# Patient Record
Sex: FEMALE | Race: WHITE | URBAN - METROPOLITAN AREA
[De-identification: names, ages, dates, MRNs, and addresses within clinical notes are randomized per-mention and may not be internally consistent; named-entity substitution may affect disease eponyms.]

---

## 2021-04-10 ENCOUNTER — IMMUNIZATION (OUTPATIENT)
Dept: FAMILY MEDICINE CLINIC | Age: 21
End: 2021-04-10
Payer: COMMERCIAL

## 2021-04-10 PROCEDURE — 91300 COVID-19, PFIZER VACCINE 30MCG/0.3ML DOSE: CPT | Performed by: FAMILY MEDICINE

## 2021-04-10 PROCEDURE — 0001A COVID-19, PFIZER VACCINE 30MCG/0.3ML DOSE: CPT | Performed by: FAMILY MEDICINE

## 2021-05-01 ENCOUNTER — IMMUNIZATION (OUTPATIENT)
Dept: FAMILY MEDICINE CLINIC | Age: 21
End: 2021-05-01
Payer: COMMERCIAL

## 2021-05-01 PROCEDURE — 0002A COVID-19, PFIZER VACCINE 30MCG/0.3ML DOSE: CPT | Performed by: FAMILY MEDICINE

## 2021-05-01 PROCEDURE — 91300 COVID-19, PFIZER VACCINE 30MCG/0.3ML DOSE: CPT | Performed by: FAMILY MEDICINE

## 2024-01-09 ENCOUNTER — OFFICE VISIT (OUTPATIENT)
Age: 24
End: 2024-01-09

## 2024-01-09 VITALS
SYSTOLIC BLOOD PRESSURE: 119 MMHG | DIASTOLIC BLOOD PRESSURE: 80 MMHG | WEIGHT: 201 LBS | HEIGHT: 65 IN | BODY MASS INDEX: 33.49 KG/M2 | TEMPERATURE: 98.7 F | HEART RATE: 116 BPM | OXYGEN SATURATION: 98 %

## 2024-01-09 DIAGNOSIS — R05.9 COUGH, UNSPECIFIED TYPE: Primary | ICD-10-CM

## 2024-01-09 DIAGNOSIS — J40 BRONCHITIS: ICD-10-CM

## 2024-01-09 LAB
INFLUENZA VIRUS A RNA: NEGATIVE
INFLUENZA VIRUS B RNA: NEGATIVE
Lab: NORMAL
QC PASS/FAIL: NORMAL
SARS-COV-2 RDRP RESP QL NAA+PROBE: NEGATIVE

## 2024-01-09 RX ORDER — AZITHROMYCIN 250 MG/1
250 TABLET, FILM COATED ORAL SEE ADMIN INSTRUCTIONS
Qty: 6 TABLET | Refills: 0 | Status: SHIPPED | OUTPATIENT
Start: 2024-01-09 | End: 2024-01-14

## 2024-01-09 RX ORDER — BENZONATATE 100 MG/1
100 CAPSULE ORAL 3 TIMES DAILY PRN
Qty: 30 CAPSULE | Refills: 0 | Status: SHIPPED | OUTPATIENT
Start: 2024-01-09 | End: 2024-01-19

## 2024-01-09 ASSESSMENT — ENCOUNTER SYMPTOMS: COUGH: 1

## 2024-01-09 NOTE — PROGRESS NOTES
Adriane Payne (:  2000) is a 23 y.o. female,New patient, here for evaluation of the following chief complaint(s):  Cough (Wet cough, chest pain, fever, shortness of breath, symptoms x 3 days. )      ASSESSMENT/PLAN:  1. Cough, unspecified type  - POCT COVID-19 Rapid, NAAT NEGATIVE  - POCT Influenza A/B DNA NEGATIVE    2. Bronchitis  - azithromycin (ZITHROMAX) 250 MG tablet; Take 1 tablet by mouth See Admin Instructions for 5 days 500mg on day 1 followed by 250mg on days 2 - 5  Dispense: 6 tablet; Refill: 0  - benzonatate (TESSALON) 100 MG capsule; Take 1 capsule by mouth 3 times daily as needed for Cough  Dispense: 30 capsule; Refill: 0   - cough, yellow phlegm for 3 days, negative test result for covid and flu. No sob or fever, no chest pain    Return if symptoms worsen or fail to improve.    SUBJECTIVE/OBJECTIVE:  PRESENT TO CLINIC WITH COUGH AND YELLOW PHLEGM FOR 2 DAYS.NO FEVER      History provided by:  Patient  Cough        Vitals:    24 1134   BP: 119/80   Pulse: (!) 116   Temp: 98.7 °F (37.1 °C)   TempSrc: Oral   SpO2: 98%   Weight: 91.2 kg (201 lb)   Height: 1.651 m (5' 5\")       Review of Systems   Respiratory:  Positive for cough.        Physical Exam  Constitutional:       Appearance: Normal appearance.   HENT:      Head: Normocephalic and atraumatic.      Nose: Nose normal.      Mouth/Throat:      Pharynx: Oropharynx is clear.   Eyes:      Pupils: Pupils are equal, round, and reactive to light.   Pulmonary:      Effort: Pulmonary effort is normal.      Breath sounds: Normal breath sounds.   Musculoskeletal:         General: Normal range of motion.      Cervical back: Normal range of motion and neck supple.   Neurological:      Mental Status: She is alert and oriented to person, place, and time.   Psychiatric:         Mood and Affect: Mood normal.           An electronic signature was used to authenticate this note.    --Robyn Roca DO

## 2024-03-13 SDOH — HEALTH STABILITY: PHYSICAL HEALTH: ON AVERAGE, HOW MANY MINUTES DO YOU ENGAGE IN EXERCISE AT THIS LEVEL?: 20 MIN

## 2024-03-13 SDOH — HEALTH STABILITY: PHYSICAL HEALTH: ON AVERAGE, HOW MANY DAYS PER WEEK DO YOU ENGAGE IN MODERATE TO STRENUOUS EXERCISE (LIKE A BRISK WALK)?: 2 DAYS

## 2024-03-13 ASSESSMENT — ENCOUNTER SYMPTOMS
NAUSEA: 0
WHEEZING: 0
CHEST TIGHTNESS: 0
VOMITING: 0
COUGH: 0
SHORTNESS OF BREATH: 0
CONSTIPATION: 0
ABDOMINAL PAIN: 0
DIARRHEA: 0

## 2024-03-14 ENCOUNTER — OFFICE VISIT (OUTPATIENT)
Dept: FAMILY MEDICINE CLINIC | Age: 24
End: 2024-03-14
Payer: COMMERCIAL

## 2024-03-14 VITALS
SYSTOLIC BLOOD PRESSURE: 112 MMHG | BODY MASS INDEX: 33.45 KG/M2 | HEIGHT: 65 IN | DIASTOLIC BLOOD PRESSURE: 68 MMHG | OXYGEN SATURATION: 99 % | HEART RATE: 109 BPM

## 2024-03-14 DIAGNOSIS — F41.9 ANXIETY: ICD-10-CM

## 2024-03-14 DIAGNOSIS — J30.89 ENVIRONMENTAL AND SEASONAL ALLERGIES: Primary | ICD-10-CM

## 2024-03-14 DIAGNOSIS — Z76.89 ENCOUNTER TO ESTABLISH CARE: ICD-10-CM

## 2024-03-14 DIAGNOSIS — F41.0 PANIC ATTACKS: ICD-10-CM

## 2024-03-14 PROCEDURE — 99204 OFFICE O/P NEW MOD 45 MIN: CPT | Performed by: CLINICAL NURSE SPECIALIST

## 2024-03-14 RX ORDER — FLUTICASONE PROPIONATE 50 MCG
SPRAY, SUSPENSION (ML) NASAL
COMMUNITY
Start: 2019-01-01

## 2024-03-14 RX ORDER — ESCITALOPRAM OXALATE 10 MG/1
10 TABLET ORAL DAILY
Qty: 30 TABLET | Refills: 0 | Status: SHIPPED | OUTPATIENT
Start: 2024-03-14

## 2024-03-14 SDOH — ECONOMIC STABILITY: HOUSING INSECURITY
IN THE LAST 12 MONTHS, WAS THERE A TIME WHEN YOU DID NOT HAVE A STEADY PLACE TO SLEEP OR SLEPT IN A SHELTER (INCLUDING NOW)?: NO

## 2024-03-14 SDOH — ECONOMIC STABILITY: FOOD INSECURITY: WITHIN THE PAST 12 MONTHS, YOU WORRIED THAT YOUR FOOD WOULD RUN OUT BEFORE YOU GOT MONEY TO BUY MORE.: NEVER TRUE

## 2024-03-14 SDOH — ECONOMIC STABILITY: INCOME INSECURITY: HOW HARD IS IT FOR YOU TO PAY FOR THE VERY BASICS LIKE FOOD, HOUSING, MEDICAL CARE, AND HEATING?: NOT HARD AT ALL

## 2024-03-14 SDOH — ECONOMIC STABILITY: FOOD INSECURITY: WITHIN THE PAST 12 MONTHS, THE FOOD YOU BOUGHT JUST DIDN'T LAST AND YOU DIDN'T HAVE MONEY TO GET MORE.: NEVER TRUE

## 2024-03-14 ASSESSMENT — PATIENT HEALTH QUESTIONNAIRE - PHQ9
SUM OF ALL RESPONSES TO PHQ QUESTIONS 1-9: 0
2. FEELING DOWN, DEPRESSED OR HOPELESS: 0
SUM OF ALL RESPONSES TO PHQ9 QUESTIONS 1 & 2: 0
1. LITTLE INTEREST OR PLEASURE IN DOING THINGS: 0
SUM OF ALL RESPONSES TO PHQ QUESTIONS 1-9: 0

## 2024-03-14 ASSESSMENT — ENCOUNTER SYMPTOMS
EYE ITCHING: 1
RHINORRHEA: 1
BACK PAIN: 0
COLOR CHANGE: 0
SINUS PRESSURE: 1

## 2024-03-14 NOTE — PROGRESS NOTES
0    3. Panic attacks  - Trial of Lexapro 10 mg once daily  - Discussed panic attacks, information given  - escitalopram (LEXAPRO) 10 MG tablet; Take 1 tablet by mouth daily  Dispense: 30 tablet; Refill: 0    4. Encounter to establish care  - Sign appropriate paperwork to have records sent to the office  - Encouraged to schedule complete physical      Continue current treatment plan.    Current Outpatient Medications   Medication Sig Dispense Refill    fluticasone (FLONASE) 50 MCG/ACT nasal spray       escitalopram (LEXAPRO) 10 MG tablet Take 1 tablet by mouth daily 30 tablet 0    CETIRIZINE HCL PO       LORATADINE PO        No current facility-administered medications for this visit.      Return in about 1 month (around 4/14/2024), or if symptoms worsen or fail to improve, for allergies, anxiety, panic attacks, eatablish care.    Adriane received counseling on the following healthy behaviors: Needs do insurancenutrition, exercise, and medication adherence    Patient given educational materials on anxiety, panic attacks    Discussed use, benefit, and side effects of prescribed medications.  Barriers to medication compliance addressed.  All patient questions answered.  Pt voiced understanding.     Call office if experience side effects from medications.      Please note that some or all of this record was generated using voice recognition software. If there are any questions about the content of this document, please contact the author as some errors in transcription may have occurred.

## 2024-03-14 NOTE — PATIENT INSTRUCTIONS
Sign appropriate paperwork to have records sent to the office    Discussed allergies, information given    Continue Claritin 10 mg and Zyrtec 10 mg nightly for allergies    Continue Flonase 1 to 2 puffs to each nostril daily    Discussed anxiety, information given    Trial of Lexapro 5 mg (half tab) for a week then increase to full tab daily    Follow-up in 1 months, sooner if symptoms worsen or persist

## 2024-04-09 DIAGNOSIS — F41.9 ANXIETY: ICD-10-CM

## 2024-04-09 RX ORDER — ESCITALOPRAM OXALATE 10 MG/1
10 TABLET ORAL DAILY
Qty: 15 TABLET | Refills: 0 | Status: SHIPPED | OUTPATIENT
Start: 2024-04-09

## 2024-04-09 NOTE — TELEPHONE ENCOUNTER
----- Message from Adriane Payne sent at 4/8/2024 10:04 PM EDT -----  Regarding: Prescription   Contact: [phone]  Hello! I think the anxiety medicine is actually working! Yay! Question though, i won’t be able to have an appointment with you until after my prescription runs out. Is that going to be a problem? Should i be asking for a refill?

## 2024-04-24 ASSESSMENT — ENCOUNTER SYMPTOMS
WHEEZING: 0
VOMITING: 0
DIARRHEA: 0
CHEST TIGHTNESS: 0
COUGH: 0
CONSTIPATION: 0
NAUSEA: 0
SHORTNESS OF BREATH: 0
ABDOMINAL PAIN: 0

## 2024-04-25 ENCOUNTER — OFFICE VISIT (OUTPATIENT)
Dept: FAMILY MEDICINE CLINIC | Age: 24
End: 2024-04-25
Payer: COMMERCIAL

## 2024-04-25 VITALS
DIASTOLIC BLOOD PRESSURE: 62 MMHG | OXYGEN SATURATION: 99 % | HEIGHT: 65 IN | HEART RATE: 97 BPM | SYSTOLIC BLOOD PRESSURE: 110 MMHG | WEIGHT: 207 LBS | BODY MASS INDEX: 34.49 KG/M2

## 2024-04-25 DIAGNOSIS — F41.0 PANIC ATTACKS: ICD-10-CM

## 2024-04-25 DIAGNOSIS — J30.89 ENVIRONMENTAL AND SEASONAL ALLERGIES: Primary | ICD-10-CM

## 2024-04-25 DIAGNOSIS — F41.9 ANXIETY: ICD-10-CM

## 2024-04-25 PROCEDURE — 99213 OFFICE O/P EST LOW 20 MIN: CPT | Performed by: CLINICAL NURSE SPECIALIST

## 2024-04-25 RX ORDER — ESCITALOPRAM OXALATE 10 MG/1
10 TABLET ORAL DAILY
Qty: 30 TABLET | Refills: 2 | Status: SHIPPED | OUTPATIENT
Start: 2024-04-25

## 2024-04-25 ASSESSMENT — PATIENT HEALTH QUESTIONNAIRE - PHQ9
SUM OF ALL RESPONSES TO PHQ QUESTIONS 1-9: 0
2. FEELING DOWN, DEPRESSED OR HOPELESS: NOT AT ALL
1. LITTLE INTEREST OR PLEASURE IN DOING THINGS: NOT AT ALL
SUM OF ALL RESPONSES TO PHQ9 QUESTIONS 1 & 2: 0
SUM OF ALL RESPONSES TO PHQ QUESTIONS 1-9: 0

## 2024-04-25 NOTE — PATIENT INSTRUCTIONS
Continue Claritin 10 mg and Zyrtec 10 mg nightly for allergies     Continue Flonase 1 to 2 puffs to each nostril daily     Discussed anxiety, information given     Trial of Lexapro 5 mg (half tab) for a week then increase to full tab daily     Follow-up in 1 months, sooner if symptoms worsen or persist

## 2024-04-25 NOTE — PROGRESS NOTES
SUBJECTIVE:    Patient ID:  Adriane Payne is a 23 y.o. female      Patient is here to establish care, discuss allergies with possible referral to allergist and to discuss treatment for anxiety.       Patient's allergies, medication, medical, surgical, family and social history were reviewed and updated accordingly.  She is working on her Masters in behavorial analysis at .       States she has been taking Claritin in the morning and Zyrtec at night, with minimal relief.  She is requesting a referral to an allergist.      Anxiety: Patient complains of evaluation of anxiety disorder and panic attacks.  She has the following anxiety symptoms: difficulty concentrating, feelings of losing control, irritable, palpitations, psychomotor agitation, racing thoughts. Onset of symptoms was approximately several years ago, unchanged since that time. She denies current thoughts of self harm, suicidal and homicidal ideation. Family history significant for anxiety and depression.Possible organic causes contributing are: nicotine vape. Risk factors: positive family history in  parents and negative life event COVID pandemic.  Previous treatment includes none and none.  She complains of the following side effects from the treatment: none.    Visited grandmother in [place] recently.          Current Outpatient Medications on File Prior to Visit   Medication Sig Dispense Refill    CETIRIZINE HCL PO       LORATADINE PO       fluticasone (FLONASE) 50 MCG/ACT nasal spray        No current facility-administered medications on file prior to visit.      Past Medical History:   Diagnosis Date    Allergic rhinitis 2008    Anxiety 2019    Asthma 2022    last allergist said i have it, could actually not?     History reviewed. No pertinent surgical history.  Family History   Problem Relation Age of Onset    Hypertension Father     Asthma Father     High Blood Pressure Father     Breast Cancer Maternal Grandmother     Diabetes Maternal

## 2024-05-31 DIAGNOSIS — J40 BRONCHITIS: ICD-10-CM

## 2024-06-03 RX ORDER — BENZONATATE 100 MG/1
CAPSULE ORAL
Qty: 30 CAPSULE | Refills: 0 | OUTPATIENT
Start: 2024-06-03

## 2024-08-11 DIAGNOSIS — F41.9 ANXIETY: ICD-10-CM

## 2024-08-12 ENCOUNTER — PATIENT MESSAGE (OUTPATIENT)
Dept: FAMILY MEDICINE CLINIC | Age: 24
End: 2024-08-12

## 2024-08-12 DIAGNOSIS — F41.9 ANXIETY: ICD-10-CM

## 2024-08-12 RX ORDER — ESCITALOPRAM OXALATE 10 MG/1
10 TABLET ORAL DAILY
Qty: 15 TABLET | Refills: 0 | Status: SHIPPED | OUTPATIENT
Start: 2024-08-12

## 2024-08-12 RX ORDER — ESCITALOPRAM OXALATE 10 MG/1
10 TABLET ORAL DAILY
Qty: 30 TABLET | Refills: 2 | OUTPATIENT
Start: 2024-08-12

## 2024-08-12 NOTE — TELEPHONE ENCOUNTER
Please let patient know partial refill has been sent in to the pharmacy and have her schedule an appointment within the next week or two. Thanks

## 2024-08-25 NOTE — PROGRESS NOTES
SUBJECTIVE:    Patient ID:  Adriane Payne is a 24 y.o. female      Patient is here for a medication check for allergies, anxiety and panic attacks.  She is doing fair to well in current regimen.  States she does notice an increase of anxiety around her menstrual cycle.       States she has been taking Claritin in the morning and Zyrtec at night, with minimal relief.  She is requesting a referral to an allergist.      Anxiety: Patient complains of evaluation of anxiety disorder and panic attacks.  She has the following anxiety symptoms: difficulty concentrating, feelings of losing control, irritable, palpitations, psychomotor agitation, racing thoughts. Onset of symptoms was approximately several years ago, unchanged since that time. She denies current thoughts of self harm, suicidal and homicidal ideation. Family history significant for anxiety and depression.Possible organic causes contributing are: nicotine vape. Risk factors: positive family history in  parents and negative life event COVID pandemic.  Previous treatment includes none and none.  She complains of the following side effects from the treatment: none.    She is working on her Masters in behavorial analysis at .  She is hoping to get her PhD as well.        Current Outpatient Medications on File Prior to Visit   Medication Sig Dispense Refill    CETIRIZINE HCL PO       LORATADINE PO       fluticasone (FLONASE) 50 MCG/ACT nasal spray        No current facility-administered medications on file prior to visit.      Past Medical History:   Diagnosis Date    Allergic rhinitis 2008    Anxiety 2019    Asthma 2022    last allergist said i have it, could actually not?     History reviewed. No pertinent surgical history.  Family History   Problem Relation Age of Onset    Hypertension Father     Asthma Father     High Blood Pressure Father     Breast Cancer Maternal Grandmother     Diabetes Maternal Grandfather      Social History     Socioeconomic History     Marital status: Single     Spouse name: Not on file    Number of children: Not on file    Years of education: Not on file    Highest education level: Not on file   Occupational History    Not on file   Tobacco Use    Smoking status: Never    Smokeless tobacco: Never   Vaping Use    Vaping status: Every Day    Substances: Nicotine (5%)   Substance and Sexual Activity    Alcohol use: Not Currently    Drug use: Never    Sexual activity: Not Currently     Partners: Male   Other Topics Concern    Not on file   Social History Narrative    Not on file     Social Determinants of Health     Financial Resource Strain: Low Risk  (3/14/2024)    Overall Financial Resource Strain (CARDIA)     Difficulty of Paying Living Expenses: Not hard at all   Food Insecurity: No Food Insecurity (3/14/2024)    Hunger Vital Sign     Worried About Running Out of Food in the Last Year: Never true     Ran Out of Food in the Last Year: Never true   Transportation Needs: Unknown (3/14/2024)    PRAPARE - Transportation     Lack of Transportation (Medical): Not on file     Lack of Transportation (Non-Medical): No   Physical Activity: Insufficiently Active (3/13/2024)    Exercise Vital Sign     Days of Exercise per Week: 2 days     Minutes of Exercise per Session: 20 min   Stress: Not on file   Social Connections: Not on file   Intimate Partner Violence: Not on file   Housing Stability: Unknown (3/14/2024)    Housing Stability Vital Sign     Unable to Pay for Housing in the Last Year: Not on file     Number of Places Lived in the Last Year: Not on file     Unstable Housing in the Last Year: No       Review of Systems   Constitutional:  Negative for chills and fever.   Eyes:  Negative for visual disturbance.   Respiratory:  Negative for cough, chest tightness, shortness of breath and wheezing.    Cardiovascular:  Negative for chest pain, palpitations and leg swelling.   Gastrointestinal:  Negative for abdominal pain, constipation, diarrhea, nausea and

## 2024-08-25 NOTE — PATIENT INSTRUCTIONS
Continue Claritin 10 mg and Zyrtec 10 mg nightly for allergies     Continue Flonase 1 to 2 puffs to each nostril daily     Discussed anxiety, information given     Increase Lexapro from 10 mg to 15 mg (1.5 tab) daily      Follow-up in 2 months, sooner if symptoms worsen or persist

## 2024-08-26 ENCOUNTER — OFFICE VISIT (OUTPATIENT)
Dept: FAMILY MEDICINE CLINIC | Age: 24
End: 2024-08-26
Payer: COMMERCIAL

## 2024-08-26 VITALS — OXYGEN SATURATION: 98 % | SYSTOLIC BLOOD PRESSURE: 118 MMHG | DIASTOLIC BLOOD PRESSURE: 70 MMHG | HEART RATE: 101 BPM

## 2024-08-26 DIAGNOSIS — F41.0 PANIC ATTACKS: ICD-10-CM

## 2024-08-26 DIAGNOSIS — J30.89 ENVIRONMENTAL AND SEASONAL ALLERGIES: Primary | ICD-10-CM

## 2024-08-26 DIAGNOSIS — F41.9 ANXIETY: ICD-10-CM

## 2024-08-26 PROCEDURE — 99214 OFFICE O/P EST MOD 30 MIN: CPT | Performed by: CLINICAL NURSE SPECIALIST

## 2024-08-26 RX ORDER — ESCITALOPRAM OXALATE 10 MG/1
15 TABLET ORAL DAILY
Qty: 45 TABLET | Refills: 1 | Status: SHIPPED | OUTPATIENT
Start: 2024-08-26

## 2024-08-26 ASSESSMENT — PATIENT HEALTH QUESTIONNAIRE - PHQ9
SUM OF ALL RESPONSES TO PHQ QUESTIONS 1-9: 0
2. FEELING DOWN, DEPRESSED OR HOPELESS: NOT AT ALL
SUM OF ALL RESPONSES TO PHQ QUESTIONS 1-9: 0
SUM OF ALL RESPONSES TO PHQ9 QUESTIONS 1 & 2: 0
1. LITTLE INTEREST OR PLEASURE IN DOING THINGS: NOT AT ALL

## 2024-10-14 ENCOUNTER — OFFICE VISIT (OUTPATIENT)
Dept: FAMILY MEDICINE CLINIC | Age: 24
End: 2024-10-14
Payer: COMMERCIAL

## 2024-10-14 VITALS
WEIGHT: 230 LBS | OXYGEN SATURATION: 98 % | SYSTOLIC BLOOD PRESSURE: 121 MMHG | BODY MASS INDEX: 38.27 KG/M2 | HEART RATE: 84 BPM | DIASTOLIC BLOOD PRESSURE: 80 MMHG | TEMPERATURE: 98.8 F

## 2024-10-14 DIAGNOSIS — J30.9 ALLERGIC RHINITIS, UNSPECIFIED SEASONALITY, UNSPECIFIED TRIGGER: ICD-10-CM

## 2024-10-14 DIAGNOSIS — J06.9 VIRAL URI: Primary | ICD-10-CM

## 2024-10-14 PROCEDURE — 99203 OFFICE O/P NEW LOW 30 MIN: CPT | Performed by: FAMILY MEDICINE

## 2024-10-14 PROCEDURE — 90661 CCIIV3 VAC ABX FR 0.5 ML IM: CPT | Performed by: FAMILY MEDICINE

## 2024-10-14 PROCEDURE — 90471 IMMUNIZATION ADMIN: CPT | Performed by: FAMILY MEDICINE

## 2024-10-14 RX ORDER — METHYLPREDNISOLONE 4 MG
TABLET, DOSE PACK ORAL
Qty: 1 KIT | Refills: 0 | Status: SHIPPED | OUTPATIENT
Start: 2024-10-14 | End: 2024-10-20

## 2024-10-14 RX ORDER — BENZONATATE 100 MG/1
100-200 CAPSULE ORAL 3 TIMES DAILY PRN
Qty: 20 CAPSULE | Refills: 0 | Status: SHIPPED | OUTPATIENT
Start: 2024-10-14 | End: 2024-10-21

## 2024-10-14 NOTE — PROGRESS NOTES
Subjective   History was provided by the patient.    Adriane Payne is a 24 y.o. female with history of seasonal allergies who presents for evaluation of symptoms of a URI. Symptoms include nasal congestion, post nasal drip, and cough and intermittent chest tightness.  No wheezing or shortness of breath.  Patient reports this is the time her allergies generally kick up.  She has been taking Claritin and Zyrtec in morning and evening and Sudafed.  No fever or chills onset of symptoms was 2 days ago, and is unchanged since that time. Evaluation to date: none. Treatment to date: antihistamines, nasal steroid.       Objective   Physical Exam   Physical Exam  General: alert, appears stated age, and cooperative  Ear exam: normal TM's and external ear canals both ears  Neck exam: no adenopathy, supple, symmetrical, trachea midline, and thyroid not enlarged, symmetric, no tenderness/mass/nodules  Heart exam: normal rate and regular rhythm  Lung exam: clear to auscultation bilaterally      Assessment   viral upper respiratory illness and allergic rhinitis      Plan   Supportive care with appropriate antipyretics and fluids.  Educational material distributed and questions answered.  Medrol Dosepak and Tessalon Perle cough   Reevaluate if symptoms persist or worsen

## 2024-10-20 NOTE — PROGRESS NOTES
SUBJECTIVE:    Patient ID:  Adriane Payne is a 24 y.o. female      Patient is here for an acute visit for concerns of continue URI symptoms for about 10 days    She was seen by a colleague on 10/14/2024 and was diagnosed with a viral URI allergies and was treated with a Medrol Dosepak and Tessalon Perles.    Cold Symptoms   This is a new problem. Episode onset: 10 days. The problem has been gradually worsening. There has been no fever. Associated symptoms include congestion, coughing, headaches, rhinorrhea and a sore throat. Pertinent negatives include no abdominal pain (cough), chest pain, diarrhea, nausea, plugged ear sensation, rash, vomiting or wheezing. Treatments tried: Medrol Dose Pack.       Current Outpatient Medications on File Prior to Visit   Medication Sig Dispense Refill    escitalopram (LEXAPRO) 10 MG tablet Take 1.5 tablets by mouth daily 45 tablet 1    CETIRIZINE HCL PO       LORATADINE PO       fluticasone (FLONASE) 50 MCG/ACT nasal spray        No current facility-administered medications on file prior to visit.      Past Medical History:   Diagnosis Date    Allergic rhinitis 2008    Anxiety 2019    Asthma 2022    last allergist said i have it, could actually not?     No past surgical history on file.  Family History   Problem Relation Age of Onset    Hypertension Father     Asthma Father     High Blood Pressure Father     Breast Cancer Maternal Grandmother     Diabetes Maternal Grandfather      Social History     Socioeconomic History    Marital status: Single     Spouse name: Not on file    Number of children: Not on file    Years of education: Not on file    Highest education level: Not on file   Occupational History    Not on file   Tobacco Use    Smoking status: Never    Smokeless tobacco: Never   Vaping Use    Vaping status: Every Day    Substances: Nicotine (5%)   Substance and Sexual Activity    Alcohol use: Not Currently    Drug use: Never    Sexual activity: Not Currently     Partners:

## 2024-10-20 NOTE — PATIENT INSTRUCTIONS
Antibiotic as directed.    Flonase 1-2 puffs to each nostril daily or one puff to each nostril twice daily    Mucinex 600 to 55755 mg twice daily with full glass of water for congestion     Prescription cough medication 2-3 times a day as needed for cough, may cause drowsiness.    Cepacol Lozenges as needed for sore throat.      Tylenol and or Motrin as needed/directed for pain and fever.      Encourage rest and fluids.      Follow up as if symptoms worsen or persist

## 2024-10-21 ENCOUNTER — OFFICE VISIT (OUTPATIENT)
Dept: FAMILY MEDICINE CLINIC | Age: 24
End: 2024-10-21
Payer: COMMERCIAL

## 2024-10-21 VITALS
WEIGHT: 225.4 LBS | OXYGEN SATURATION: 99 % | DIASTOLIC BLOOD PRESSURE: 80 MMHG | HEART RATE: 107 BPM | SYSTOLIC BLOOD PRESSURE: 114 MMHG | BODY MASS INDEX: 37.51 KG/M2

## 2024-10-21 DIAGNOSIS — R05.1 ACUTE COUGH: ICD-10-CM

## 2024-10-21 DIAGNOSIS — J30.89 ENVIRONMENTAL AND SEASONAL ALLERGIES: ICD-10-CM

## 2024-10-21 DIAGNOSIS — Z72.0 VAPES NICOTINE CONTAINING SUBSTANCE: ICD-10-CM

## 2024-10-21 DIAGNOSIS — J40 BRONCHITIS: Primary | ICD-10-CM

## 2024-10-21 PROCEDURE — 99213 OFFICE O/P EST LOW 20 MIN: CPT | Performed by: CLINICAL NURSE SPECIALIST

## 2024-10-21 RX ORDER — AZITHROMYCIN 250 MG/1
TABLET, FILM COATED ORAL
Qty: 6 TABLET | Refills: 0 | Status: SHIPPED | OUTPATIENT
Start: 2024-10-21 | End: 2024-10-31

## 2024-10-21 RX ORDER — BENZONATATE 100 MG/1
100-200 CAPSULE ORAL 3 TIMES DAILY PRN
Qty: 60 CAPSULE | Refills: 0 | Status: SHIPPED | OUTPATIENT
Start: 2024-10-21 | End: 2024-10-28

## 2024-10-21 ASSESSMENT — ENCOUNTER SYMPTOMS
COUGH: 1
RHINORRHEA: 1
ABDOMINAL PAIN: 0
SORE THROAT: 1
SINUS PRESSURE: 1

## 2024-10-21 ASSESSMENT — PATIENT HEALTH QUESTIONNAIRE - PHQ9
SUM OF ALL RESPONSES TO PHQ9 QUESTIONS 1 & 2: 0
SUM OF ALL RESPONSES TO PHQ QUESTIONS 1-9: 0
SUM OF ALL RESPONSES TO PHQ QUESTIONS 1-9: 0
1. LITTLE INTEREST OR PLEASURE IN DOING THINGS: NOT AT ALL
SUM OF ALL RESPONSES TO PHQ QUESTIONS 1-9: 0
SUM OF ALL RESPONSES TO PHQ QUESTIONS 1-9: 0
2. FEELING DOWN, DEPRESSED OR HOPELESS: NOT AT ALL

## 2024-10-28 DIAGNOSIS — F41.9 ANXIETY: ICD-10-CM

## 2024-10-30 RX ORDER — ESCITALOPRAM OXALATE 10 MG/1
TABLET ORAL
Qty: 45 TABLET | Refills: 1 | Status: SHIPPED | OUTPATIENT
Start: 2024-10-30 | End: 2024-11-05 | Stop reason: SDUPTHER

## 2024-11-04 ASSESSMENT — ENCOUNTER SYMPTOMS
SHORTNESS OF BREATH: 0
CHEST TIGHTNESS: 0
CONSTIPATION: 0

## 2024-11-05 ENCOUNTER — OFFICE VISIT (OUTPATIENT)
Dept: FAMILY MEDICINE CLINIC | Age: 24
End: 2024-11-05
Payer: COMMERCIAL

## 2024-11-05 VITALS
OXYGEN SATURATION: 98 % | WEIGHT: 230 LBS | BODY MASS INDEX: 38.32 KG/M2 | HEART RATE: 115 BPM | RESPIRATION RATE: 20 BRPM | HEIGHT: 65 IN | SYSTOLIC BLOOD PRESSURE: 110 MMHG | DIASTOLIC BLOOD PRESSURE: 86 MMHG

## 2024-11-05 DIAGNOSIS — F41.9 ANXIETY: ICD-10-CM

## 2024-11-05 DIAGNOSIS — Z72.0 VAPES NICOTINE CONTAINING SUBSTANCE: ICD-10-CM

## 2024-11-05 DIAGNOSIS — J30.89 ENVIRONMENTAL AND SEASONAL ALLERGIES: Primary | ICD-10-CM

## 2024-11-05 DIAGNOSIS — S89.92XA INJURY OF LEFT KNEE, INITIAL ENCOUNTER: ICD-10-CM

## 2024-11-05 PROCEDURE — 99214 OFFICE O/P EST MOD 30 MIN: CPT | Performed by: CLINICAL NURSE SPECIALIST

## 2024-11-05 RX ORDER — ESCITALOPRAM OXALATE 10 MG/1
15 TABLET ORAL DAILY
Qty: 45 TABLET | Refills: 3 | Status: SHIPPED | OUTPATIENT
Start: 2024-11-05

## 2024-11-05 RX ORDER — NAPROXEN 500 MG/1
500 TABLET ORAL 2 TIMES DAILY WITH MEALS
Qty: 60 TABLET | Refills: 1 | Status: SHIPPED | OUTPATIENT
Start: 2024-11-05

## 2024-11-05 NOTE — PROGRESS NOTES
SUBJECTIVE:    Patient ID:  Adriane Payne is a 24 y.o. female      Patient is here for a medication check for allergies, anxiety and panic attacks.  She is doing fair to well in current regimen.  She is also concerned about left knee pain since yesterday.  States she had a new pain of high-tops on, miss-stepped and fell after a fall and heard a pop.       States she has been taking Claritin in the morning and Zyrtec at night, with minimal relief.  She is requesting a referral to an allergist.      Anxiety: Patient complains of evaluation of anxiety disorder and panic attacks.  She has the following anxiety symptoms: difficulty concentrating, feelings of losing control, irritable, palpitations, psychomotor agitation, racing thoughts. Onset of symptoms was approximately several years ago, unchanged since that time. She denies current thoughts of self harm, suicidal and homicidal ideation. Family history significant for anxiety and depression.Possible organic causes contributing are: nicotine vape. Risk factors: positive family history in  parents and negative life event COVID pandemic.  Previous treatment includes none and none.  She complains of the following side effects from the treatment: none.     She is working on her Masters in behavorial analysis at .  She is hoping to get her PhD as well.    Knee Pain   Incident onset: yesterday. The incident occurred at work. The injury mechanism was a fall. The pain is present in the left knee. The quality of the pain is described as aching. The pain has been Constant since onset. Associated symptoms include an inability to bear weight (due to discomfort) and a loss of motion (unable to streighten). Pertinent negatives include no numbness or tingling. She has tried nothing for the symptoms.       Current Outpatient Medications on File Prior to Visit   Medication Sig Dispense Refill   • CETIRIZINE HCL PO      • LORATADINE PO      • fluticasone (FLONASE) 50 MCG/ACT nasal

## 2024-11-09 ASSESSMENT — ENCOUNTER SYMPTOMS
ABDOMINAL PAIN: 0
NAUSEA: 0
COUGH: 0
VOMITING: 0
WHEEZING: 0
DIARRHEA: 0

## 2025-01-09 DIAGNOSIS — F41.9 ANXIETY: ICD-10-CM

## 2025-01-09 RX ORDER — ESCITALOPRAM OXALATE 10 MG/1
TABLET ORAL
Qty: 45 TABLET | Refills: 3 | OUTPATIENT
Start: 2025-01-09

## 2025-01-09 NOTE — TELEPHONE ENCOUNTER
Medication:   Requested Prescriptions     Pending Prescriptions Disp Refills    escitalopram (LEXAPRO) 10 MG tablet [Pharmacy Med Name: ESCITALOPRAM 10 MG TABLET] 45 tablet 3     Sig: TAKE 1.5 TABLET BY MOUTH DAILY     Last Filled:  11/5/24    Last appt: 11/5/2024   Next appt: Visit date not found    Last OARRS:        No data to display

## 2025-03-02 DIAGNOSIS — F41.9 ANXIETY: ICD-10-CM

## 2025-03-03 RX ORDER — ESCITALOPRAM OXALATE 10 MG/1
15 TABLET ORAL DAILY
Qty: 45 TABLET | Refills: 3 | OUTPATIENT
Start: 2025-03-03

## 2025-03-10 SDOH — ECONOMIC STABILITY: FOOD INSECURITY: WITHIN THE PAST 12 MONTHS, YOU WORRIED THAT YOUR FOOD WOULD RUN OUT BEFORE YOU GOT MONEY TO BUY MORE.: NEVER TRUE

## 2025-03-10 SDOH — ECONOMIC STABILITY: FOOD INSECURITY: WITHIN THE PAST 12 MONTHS, THE FOOD YOU BOUGHT JUST DIDN'T LAST AND YOU DIDN'T HAVE MONEY TO GET MORE.: NEVER TRUE

## 2025-03-10 SDOH — ECONOMIC STABILITY: INCOME INSECURITY: IN THE LAST 12 MONTHS, WAS THERE A TIME WHEN YOU WERE NOT ABLE TO PAY THE MORTGAGE OR RENT ON TIME?: NO

## 2025-03-10 SDOH — ECONOMIC STABILITY: TRANSPORTATION INSECURITY
IN THE PAST 12 MONTHS, HAS LACK OF TRANSPORTATION KEPT YOU FROM MEETINGS, WORK, OR FROM GETTING THINGS NEEDED FOR DAILY LIVING?: NO

## 2025-03-10 SDOH — ECONOMIC STABILITY: TRANSPORTATION INSECURITY
IN THE PAST 12 MONTHS, HAS THE LACK OF TRANSPORTATION KEPT YOU FROM MEDICAL APPOINTMENTS OR FROM GETTING MEDICATIONS?: PATIENT DECLINED

## 2025-03-10 ASSESSMENT — PATIENT HEALTH QUESTIONNAIRE - PHQ9
2. FEELING DOWN, DEPRESSED OR HOPELESS: NOT AT ALL
1. LITTLE INTEREST OR PLEASURE IN DOING THINGS: NOT AT ALL
1. LITTLE INTEREST OR PLEASURE IN DOING THINGS: NOT AT ALL
SUM OF ALL RESPONSES TO PHQ QUESTIONS 1-9: 0
2. FEELING DOWN, DEPRESSED OR HOPELESS: NOT AT ALL
SUM OF ALL RESPONSES TO PHQ QUESTIONS 1-9: 0
SUM OF ALL RESPONSES TO PHQ9 QUESTIONS 1 & 2: 0

## 2025-03-10 NOTE — PROGRESS NOTES
SUBJECTIVE:    Patient ID:  Adriane Payne is a 24 y.o. female      Patient is here for a medication check for allergies, anxiety and panic attacks.  She is doing fair to well in current regimen.       Anxiety: Patient complains of evaluation of anxiety disorder and panic attacks.  She has the following anxiety symptoms: difficulty concentrating, feelings of losing control, irritable, palpitations, psychomotor agitation, racing thoughts. Onset of symptoms was approximately several years ago, unchanged since that time. She denies current thoughts of self harm, suicidal and homicidal ideation. Family history significant for anxiety and depression.Possible organic causes contributing are: nicotine vape. Risk factors: positive family history in  parents and negative life event COVID pandemic.  Previous treatment includes none and none.  She complains of the following side effects from the treatment: none.     She is working on her Masters in behavorial analysis at .  She is hoping to get her PhD as well.        Current Outpatient Medications on File Prior to Visit   Medication Sig Dispense Refill    CETIRIZINE HCL PO       LORATADINE PO       fluticasone (FLONASE) 50 MCG/ACT nasal spray        No current facility-administered medications on file prior to visit.      Past Medical History:   Diagnosis Date    Allergic rhinitis 2008    Anxiety 2019    Asthma 2022    last allergist said i have it, could actually not?     No past surgical history on file.  Family History   Problem Relation Age of Onset    Hypertension Father     Asthma Father     High Blood Pressure Father     Breast Cancer Maternal Grandmother     Diabetes Maternal Grandfather      Social History     Socioeconomic History    Marital status: Single     Spouse name: Not on file    Number of children: Not on file    Years of education: Not on file    Highest education level: Not on file   Occupational History    Not on file   Tobacco Use    Smoking status:

## 2025-03-10 NOTE — PATIENT INSTRUCTIONS
Continue Claritin 10 mg and Zyrtec 10 mg nightly for allergies     Continue Flonase 1 to 2 puffs to each nostril daily     Discussed anxiety, information given     Continue Lexapro 15 mg (1.5 tab) daily      Naproxen twice a day as needed (take with food and full glass of water)     No Aleve, Advil, Ibuprofen, Motrin or aspirin while taking naproxen.     Watch for GI symptoms (heart burn, indigestion, epigastric pain or blood in stool)     Can take Tylenol as needed/directed, not to exceed more than 3000 to 4000 mg in a 24-hour period     Stretches/exercises given.     Heat or ice, whichever feels better.     Encouraged rest, ice, compression and elevation      Follow up in 4-6 weeks, sooner if symptoms worsen or persist     Follow-up in 3-4 months, sooner if symptoms worsen or persist

## 2025-03-11 ENCOUNTER — OFFICE VISIT (OUTPATIENT)
Dept: FAMILY MEDICINE CLINIC | Age: 25
End: 2025-03-11
Payer: COMMERCIAL

## 2025-03-11 VITALS — DIASTOLIC BLOOD PRESSURE: 68 MMHG | HEART RATE: 105 BPM | OXYGEN SATURATION: 98 % | SYSTOLIC BLOOD PRESSURE: 120 MMHG

## 2025-03-11 DIAGNOSIS — Z72.0 VAPES NICOTINE CONTAINING SUBSTANCE: ICD-10-CM

## 2025-03-11 DIAGNOSIS — F41.0 PANIC ATTACKS: ICD-10-CM

## 2025-03-11 DIAGNOSIS — J30.89 ENVIRONMENTAL AND SEASONAL ALLERGIES: Primary | ICD-10-CM

## 2025-03-11 DIAGNOSIS — F41.9 ANXIETY: ICD-10-CM

## 2025-03-11 PROCEDURE — 99214 OFFICE O/P EST MOD 30 MIN: CPT | Performed by: CLINICAL NURSE SPECIALIST

## 2025-03-11 RX ORDER — ESCITALOPRAM OXALATE 10 MG/1
15 TABLET ORAL DAILY
Qty: 45 TABLET | Refills: 3 | Status: SHIPPED | OUTPATIENT
Start: 2025-03-11

## 2025-03-12 RX ORDER — METHYLPREDNISOLONE 4 MG/1
TABLET ORAL
Qty: 21 TABLET | OUTPATIENT
Start: 2025-03-12

## 2025-07-10 ASSESSMENT — ENCOUNTER SYMPTOMS
NAUSEA: 0
SHORTNESS OF BREATH: 0
CONSTIPATION: 0
WHEEZING: 0
ABDOMINAL PAIN: 0
DIARRHEA: 0
VOMITING: 0
CHEST TIGHTNESS: 0

## 2025-07-10 NOTE — PROGRESS NOTES
SUBJECTIVE:    Patient ID:  Adriane Payne is a 25 y.o. female      Patient is here for a medication check for allergies, anxiety and panic attacks.  She is doing fair to well in current regimen.       Anxiety: Patient complains of evaluation of anxiety disorder and panic attacks.  She has the following anxiety symptoms: difficulty concentrating, feelings of losing control, irritable, palpitations, psychomotor agitation, racing thoughts. Onset of symptoms was approximately several years ago, unchanged since that time. She denies current thoughts of self harm, suicidal and homicidal ideation. Family history significant for anxiety and depression.Possible organic causes contributing are: nicotine vape. Risk factors: positive family history in  parents and negative life event COVID pandemic.  Previous treatment includes none and none.  She complains of the following side effects from the treatment: none.     She is working on her Masters in behavorial analysis at .  She is hoping to get her PhD as well.    Cold Symptoms   This is a new problem. The current episode started 1 to 4 weeks ago. The problem has been gradually worsening. The fever has been present for Less than 1 day. Associated symptoms include congestion, coughing, rhinorrhea and a sore throat. Pertinent negatives include no abdominal pain, chest pain, diarrhea, ear pain, headaches, nausea, rash, vomiting or wheezing. Treatments tried: Claritin, Flonase, Zyrtec, benadry. The treatment provided mild relief.       Current Outpatient Medications on File Prior to Visit   Medication Sig Dispense Refill    CETIRIZINE HCL PO       LORATADINE PO        No current facility-administered medications on file prior to visit.      Past Medical History:   Diagnosis Date    Allergic rhinitis 2008    Anxiety 2019    Asthma 2022    last allergist said i have it, could actually not?     History reviewed. No pertinent surgical history.  Family History   Problem Relation Age

## 2025-07-10 NOTE — PATIENT INSTRUCTIONS
Continue Claritin 10 mg and Zyrtec 10 mg nightly for allergies     Continue Flonase 1 to 2 puffs to each nostril daily     Discussed anxiety, information given     Continue Lexapro 15 mg (1.5 tab) daily     Medrol Dosepak as directed take all 6 tablets at once tomorrow with breakfast    Pataday 1 drop to both eyes twice daily    Referral to allergist as requested    Call/follow-up if symptoms worsen or persist.     Follow-up in 3-4 months, sooner if symptoms worsen or persist

## 2025-07-11 ENCOUNTER — OFFICE VISIT (OUTPATIENT)
Dept: FAMILY MEDICINE CLINIC | Age: 25
End: 2025-07-11
Payer: COMMERCIAL

## 2025-07-11 VITALS
OXYGEN SATURATION: 97 % | HEIGHT: 65 IN | BODY MASS INDEX: 39.35 KG/M2 | WEIGHT: 236.2 LBS | DIASTOLIC BLOOD PRESSURE: 72 MMHG | SYSTOLIC BLOOD PRESSURE: 118 MMHG | HEART RATE: 92 BPM

## 2025-07-11 DIAGNOSIS — F41.9 ANXIETY: ICD-10-CM

## 2025-07-11 DIAGNOSIS — F41.0 PANIC ATTACKS: ICD-10-CM

## 2025-07-11 DIAGNOSIS — Z72.0 VAPES NICOTINE CONTAINING SUBSTANCE: ICD-10-CM

## 2025-07-11 DIAGNOSIS — J30.89 ENVIRONMENTAL AND SEASONAL ALLERGIES: Primary | ICD-10-CM

## 2025-07-11 DIAGNOSIS — H57.9 ITCHY EYES: ICD-10-CM

## 2025-07-11 PROCEDURE — 99214 OFFICE O/P EST MOD 30 MIN: CPT | Performed by: CLINICAL NURSE SPECIALIST

## 2025-07-11 RX ORDER — FLUTICASONE PROPIONATE 50 MCG
2 SPRAY, SUSPENSION (ML) NASAL DAILY
Qty: 16 G | Refills: 3 | Status: SHIPPED | OUTPATIENT
Start: 2025-07-11

## 2025-07-11 RX ORDER — ESCITALOPRAM OXALATE 10 MG/1
15 TABLET ORAL DAILY
Qty: 45 TABLET | Refills: 3 | Status: SHIPPED | OUTPATIENT
Start: 2025-07-11

## 2025-07-11 RX ORDER — METHYLPREDNISOLONE 4 MG/1
TABLET ORAL
Qty: 1 KIT | Refills: 0 | Status: SHIPPED | OUTPATIENT
Start: 2025-07-11 | End: 2025-07-17

## 2025-07-11 RX ORDER — OLOPATADINE HYDROCHLORIDE 1 MG/ML
1 SOLUTION OPHTHALMIC 2 TIMES DAILY
Qty: 5 ML | Refills: 1 | Status: SHIPPED | OUTPATIENT
Start: 2025-07-11 | End: 2025-08-10

## 2025-07-11 ASSESSMENT — ENCOUNTER SYMPTOMS
SORE THROAT: 1
RHINORRHEA: 1
COUGH: 1